# Patient Record
Sex: FEMALE | Race: WHITE | NOT HISPANIC OR LATINO | Employment: STUDENT | URBAN - NONMETROPOLITAN AREA
[De-identification: names, ages, dates, MRNs, and addresses within clinical notes are randomized per-mention and may not be internally consistent; named-entity substitution may affect disease eponyms.]

---
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
## 2024-03-10 ENCOUNTER — HOSPITAL ENCOUNTER (EMERGENCY)
Facility: HOSPITAL | Age: 19
Discharge: HOME | End: 2024-03-10
Attending: EMERGENCY MEDICINE | Admitting: EMERGENCY MEDICINE
Payer: MEDICAID

## 2024-03-10 VITALS
SYSTOLIC BLOOD PRESSURE: 148 MMHG | OXYGEN SATURATION: 98 % | DIASTOLIC BLOOD PRESSURE: 98 MMHG | RESPIRATION RATE: 18 BRPM | BODY MASS INDEX: 29.02 KG/M2 | TEMPERATURE: 97.8 F | HEIGHT: 64 IN | WEIGHT: 170 LBS | HEART RATE: 78 BPM

## 2024-03-10 DIAGNOSIS — T20.10XA FACIAL BURN, FIRST DEGREE, INITIAL ENCOUNTER: Primary | ICD-10-CM

## 2024-03-10 PROCEDURE — 99283 EMERGENCY DEPT VISIT LOW MDM: CPT

## 2024-03-10 RX ORDER — HYDROCODONE BITARTRATE AND ACETAMINOPHEN 5; 325 MG/1; MG/1
1 TABLET ORAL ONCE
Status: COMPLETED | OUTPATIENT
Start: 2024-03-10 | End: 2024-03-10

## 2024-03-10 RX ORDER — SODIUM CHLORIDE 0.9 % (FLUSH) 0.9 %
10 SYRINGE (ML) INJECTION AS NEEDED
Status: DISCONTINUED | OUTPATIENT
Start: 2024-03-10 | End: 2024-03-10 | Stop reason: HOSPADM

## 2024-03-10 RX ORDER — BACITRACIN ZINC 500 [USP'U]/G
1 OINTMENT TOPICAL ONCE
Status: COMPLETED | OUTPATIENT
Start: 2024-03-10 | End: 2024-03-10

## 2024-03-10 RX ORDER — HYDROCODONE BITARTRATE AND ACETAMINOPHEN 5; 325 MG/1; MG/1
1 TABLET ORAL EVERY 8 HOURS PRN
Qty: 6 TABLET | Refills: 0 | Status: SHIPPED | OUTPATIENT
Start: 2024-03-10

## 2024-03-10 RX ORDER — GINSENG 100 MG
1 CAPSULE ORAL 2 TIMES DAILY
Qty: 9 G | Refills: 0 | Status: SHIPPED | OUTPATIENT
Start: 2024-03-10 | End: 2024-03-15

## 2024-03-10 RX ADMIN — BACITRACIN ZINC 1 APPLICATION: 500 OINTMENT TOPICAL at 17:40

## 2024-03-10 RX ADMIN — HYDROCODONE BITARTRATE AND ACETAMINOPHEN 1 TABLET: 5; 325 TABLET ORAL at 17:40
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
